# Patient Record
Sex: MALE | Race: WHITE
[De-identification: names, ages, dates, MRNs, and addresses within clinical notes are randomized per-mention and may not be internally consistent; named-entity substitution may affect disease eponyms.]

---

## 2020-04-08 ENCOUNTER — HOSPITAL ENCOUNTER (EMERGENCY)
Dept: HOSPITAL 77 - KA.ED | Age: 85
Discharge: ANOTHER HOSPITAL | End: 2020-04-08
Payer: MEDICARE

## 2020-04-08 DIAGNOSIS — Z87.891: ICD-10-CM

## 2020-04-08 DIAGNOSIS — J18.9: Primary | ICD-10-CM

## 2020-04-08 DIAGNOSIS — Z79.899: ICD-10-CM

## 2020-04-08 LAB
ANION GAP SERPL CALC-SCNC: 17 MMOL/L (ref 5–15)
CHLORIDE SERPL-SCNC: 97 MMOL/L (ref 98–115)
SODIUM SERPL-SCNC: 135 MMOL/L (ref 136–145)

## 2020-04-08 NOTE — CR
______________________________________________________________________________   

  

[id number] RAD/RAD Chest PA And Lateral  

EXAM:   

   

 RAD Chest PA And Lateral  

   

 CLINICAL DATA:   

   

 COUGH  

   

 SHORTNESS OF BREATH  

   

 COMPARISON:   

   

 CORRELATION IS MADE WITH APRIL 6, 2020  

   

 FINDINGS:   

   

 Persistent pleural changes of the right lung base are seen  

   

 Elevation of the right hemidiaphragm is identified  

   

 The left lung is clear  

   

 The cardiomediastinal contour is stable  

   

 IMPRESSION:  

   

 PERSISTENT RIGHT-SIDED PLEURAL PATHOLOGY  

   

 CONSIDER FOLLOW-UP CONTRAST CT CHEST  

   

 Electronically signed by Timothy Oh MD on 4/8/2020 3:08 PM  

   

  

Timothy Oh MD                 

 04/08/20 [id number]    

  

Thank you for allowing us to participate in the care of your patient.

## 2020-04-08 NOTE — CT
______________________________________________________________________________   

  

[id number] CT/CT Chest WO IV  

EXAM: CHEST CT WITHOUT CONTRAST  

   

 INDICATION: PNEUMONIA.  

   

 COMPARISON: Chest x-ray April 8, 2020.  

   

 DISCUSSION: Mild elevation of the right hemidiaphragm which could relate to  

 diaphragmatic dysfunction.  

   

 There are hyperdense infiltrates in the posterior basal right lower lobe which  

 could be from previous barium aspiration. Mild bilateral peripheral reticulation  

 is nonspecific. No definite acute consolidation.  

   

 There is a small right pleural effusion with a thick rind suggesting this could  

 be chronic or complicated by blood products/infection.  

   

 Borderline heart size.  

   

 Small ascites in the imaged upper abdomen. Exophytic 17 mm cyst upper pole left  

 kidney. Arterial calcifications.  

   

 A hyperdense mass anterior to the great vessels and trachea in the superior  

 mediastinum measures about 50 x 41 x 27 mm. The native thyroid gland is small in  

 size. This is nonspecific, but could represent retrosternal thyroid tissue or  

 enlarged lymph node.  

   

 The osseous structures are unremarkable.  

   

 IMPRESSION:    

 1.  Hyperdense posterior basal right lower lobe opacities most suggestive of  

 previous barium aspiration. No definite acute infiltrates.  

 2.  Small right pleural effusion with a surrounding thick rind suggesting this  

 is chronic or has superimposed blood products or infection.  

 3.  Small ascites in the upper abdomen.  

 4.  Indeterminate 50 x 41 x 27 mm hyperdense upper mediastinal mass.  

   

 Electronically signed by Alessandro Amin MD on 4/8/2020 5:50 PM  

   

  

Alessandro Amin MD                 

 04/08/20 [id number]    

  

Thank you for allowing us to participate in the care of your patient.

## 2020-04-08 NOTE — EDM.PDOC
ED HPI GENERAL MEDICAL PROBLEM





- General


Chief Complaint: General


Stated Complaint: cogestive


Time Seen by Provider: 04/08/20 14:46


Source of Information: Reports: Patient


History Limitations: Reports: No Limitations





- History of Present Illness


INITIAL COMMENTS - FREE TEXT/NARRATIVE: 





Patient is an 84-year-old gentleman who presents to the emergency department 

this afternoon via private vehicle with a complaint of shortness of breath.  

Patient states that he was seen at [facility] on Monday, underwent chest x-

ray and was diagnosed with early pneumonia.  Patient states that he was put on 

doxycycline.  Patient states symptoms have continued to worsen and now he feels 

short of breath.  Patient also does have a history of bronchitis, and renal 

failure on peritoneal dialysis.  Patient denies fever, chest pain, travel out 

of local area, family members with similar symptoms, nausea, vomiting, diarrhea.


Onset: Gradual


Duration: Day(s):


Severity: Mild


Improves with: Reports: None


Worsens with: Reports: None


Associated Symptoms: Reports: Cough, Shortness of Breath


Treatments PTA: Reports: Breathing Treatments





- Related Data


 Allergies











Allergy/AdvReac Type Severity Reaction Status Date / Time


 


No Known Drug Allergies Allergy  Cannot Verified 04/08/20 14:23





   Remember  











Home Meds: 


 Home Meds





Acetaminophen [Tylenol Extra Strength] 1,000 mg PO Q6H PRN 04/08/20 [History]


Albuterol [Proventil Neb Soln] 1 ampule INH QID PRN 04/08/20 [History]


Calcitriol [Rocaltrol] 0.25 mcg PO DAILY 04/08/20 [History]


Calcium Acetate [PhosLo] 667 mg PO TIDMEALS 04/08/20 [History]


Doxycycline [Doxycycline Hyclate] 100 mg PO BID 04/08/20 [History]


Ferrous Sulfate [Feosol] 325 mg PO DAILY 04/08/20 [History]


Finasteride [Proscar] 5 mg PO DAILY 04/08/20 [History]


Furosemide [Lasix] 80 mg PO DAILY 04/08/20 [History]


Gentamicin [Gentamicin 0.1%] 1 gm TOP BEDTIME 04/08/20 [History]


Insulin Glargine,Hum.Rec.Anlog [Basaglar Kwikpen U-100] 20 unit SQ DAILY 04/08/ 20 [History]


Insulin Glargine,Hum.Rec.Anlog [Basaglar Kwikpen U-100] 30 unit SQ BEDTIME 04/08 /20 [History]


L.acidoph,Paracasei, B.lactis [Probiotic] 1 each PO DAILY 04/08/20 [History]


Lisinopril [Zestril] 20 mg PO DAILY 04/08/20 [History]


Loratadine [Claritin] 10 mg PO BEDTIME 04/08/20 [History]


Metoprolol Succinate [Toprol Xl] 100 mg PO DAILY 04/08/20 [History]


amLODIPine [Norvasc] 5 mg PO BID 04/08/20 [History]


glipiZIDE [Glucotrol] 10 mg PO BID 04/08/20 [History]


hydrALAZINE [Apresoline] 50 mg PO DAILY 04/08/20 [History]











Social & Family History





- Tobacco Use


Smoking Status *Q: Former Smoker


Used Tobacco, but Quit: Yes


Month/Year Tobacco Last Used: quit 1978





- Caffeine Use


Caffeine Use: Reports: None





- Recreational Drug Use


Recreational Drug Use: No





ED ROS GENERAL





- Review of Systems


Review Of Systems: Comprehensive ROS is negative, except as noted in HPI.


Constitutional: Reports: No Symptoms


HEENT: Reports: No Symptoms


Respiratory: Reports: Shortness of Breath, Wheezing, Cough


Cardiovascular: Reports: No Symptoms


Endocrine: Reports: No Symptoms


GI/Abdominal: Reports: No Symptoms


: Reports: No Symptoms


Musculoskeletal: Reports: No Symptoms


Skin: Reports: No Symptoms


Neurological: Reports: No Symptoms


Psychiatric: Reports: No Symptoms


Hematologic/Lymphatic: Reports: No Symptoms


Immunologic: Reports: No Symptoms





ED EXAM, GENERAL





- Physical Exam


Exam: See Below


Exam Limited By: No Limitations


General Appearance: Alert, WD/WN, No Apparent Distress


Eye Exam: Bilateral Eye: Normal Inspection


Nose: Normal Inspection, Normal Mucosa, No Blood


Throat/Mouth: Normal Inspection, Normal Oropharynx, No Airway Compromise


Head: Atraumatic, Normocephalic


Neck: Normal Inspection.  No: Lymphadenopathy (L), Lymphadenopathy (R)


Respiratory/Chest: No Respiratory Distress, Rales, Wheezing


Cardiovascular: Regular Rate, Rhythm, No Murmur


GI/Abdominal: Normal Bowel Sounds, Soft, Non-Tender


Back Exam: Normal Inspection.  No: CVA Tenderness (L), CVA Tenderness (R)


Extremities: Normal Inspection, No Pedal Edema


Neurological: Alert, Oriented, Normal Cognition


Psychiatric: Normal Affect, Normal Mood


Skin Exam: Warm, Dry, Intact, Normal Color, No Rash





Course





- Vital Signs


Last Recorded V/S: 





 Last Vital Signs











Temp  96.1 F L  04/08/20 14:18


 


Pulse  81   04/08/20 14:18


 


Resp  18   04/08/20 14:18


 


BP  167/66 H  04/08/20 14:18


 


Pulse Ox  93 L  04/08/20 14:18














- Orders/Labs/Meds


Orders: 





 Active Orders 24 hr











 Category Date Time Status


 


 Chest 2V [CR] Stat Exams  04/08/20 14:29 Ordered


 


 CBC WITH AUTO DIFF [HEME] Stat Lab  04/08/20 14:33 Received


 


 COMPREHENSIVE METABOLIC PN,CMP [CHEM] Stat Lab  04/08/20 14:33 Received


 


 INFLUENZA A+B AG SCREEN [RM] Stat Lab  04/08/20 14:35 Received


 


 Isolation [COMM] Routine Oth  04/08/20 14:30 Ordered














- Radiology Interpretation


Free Text/Narrative:: 





Chest x-ray shows right lower lobe pneumonia





- Re-Assessments/Exams


Free Text/Narrative Re-Assessment/Exam: 





04/08/20 15:24


Patient afebrile, vital signs stable, influenza negative.  1 g Rocephin and 500 

mg Zithromax given in ER.  Due to patient's renal failure state, normal saline 

infusion pending to correct glucose pending upon admission.


04/08/20 15:27





04/08/20 15:34


Discussed case with Ana Charlton, nurse practitioner from [facility].  

Patient will be admitted inpatient and followed.





Departure





- Departure


Time of Disposition: 15:32


Disposition: Admitted As Inpatient 68


Condition: Fair


Clinical Impression: 


 Pneumonia








- Discharge Information


Referrals: 


PCP,Not In Area [Family Provider] - 





Sepsis Event Note





- Evaluation


Sepsis Screening Result: No Definite Risk





- Focused Exam


Vital Signs: 





 Vital Signs











  Temp Pulse Resp BP Pulse Ox


 


 04/08/20 14:18  96.1 F L  81  18  167/66 H  93 L











Date Exam was Performed: 04/08/20


Time Exam was Performed: 14:47





- My Orders


Last 24 Hours: 





My Active Orders





04/08/20 14:29


Chest 2V [CR] Stat 





04/08/20 14:30


Isolation [COMM] Routine 





04/08/20 14:33


CBC WITH AUTO DIFF [HEME] Stat 


COMPREHENSIVE METABOLIC PN,CMP [CHEM] Stat 





04/08/20 14:35


INFLUENZA A+B AG SCREEN [RM] Stat 














- Assessment/Plan


Last 24 Hours: 





My Active Orders





04/08/20 14:29


Chest 2V [CR] Stat 





04/08/20 14:30


Isolation [COMM] Routine 





04/08/20 14:33


CBC WITH AUTO DIFF [HEME] Stat 


COMPREHENSIVE METABOLIC PN,CMP [CHEM] Stat 





04/08/20 14:35


INFLUENZA A+B AG SCREEN [RM] Stat 











Assessment:: 





Pneumonia


Plan: 





Admit to [place]

## 2020-11-22 ENCOUNTER — HOSPITAL ENCOUNTER (EMERGENCY)
Dept: HOSPITAL 52 - LL.ED | Age: 85
LOS: 1 days | Discharge: NURSING, REHAB OR LONG-TERM CARE | End: 2020-11-23
Payer: MEDICARE

## 2020-11-22 DIAGNOSIS — E11.40: ICD-10-CM

## 2020-11-22 DIAGNOSIS — E66.9: ICD-10-CM

## 2020-11-22 DIAGNOSIS — J44.9: ICD-10-CM

## 2020-11-22 DIAGNOSIS — Z79.899: ICD-10-CM

## 2020-11-22 DIAGNOSIS — I25.10: ICD-10-CM

## 2020-11-22 DIAGNOSIS — Z79.4: ICD-10-CM

## 2020-11-22 DIAGNOSIS — I10: ICD-10-CM

## 2020-11-22 DIAGNOSIS — N40.0: ICD-10-CM

## 2020-11-22 DIAGNOSIS — E11.51: ICD-10-CM

## 2020-11-22 DIAGNOSIS — E87.1: ICD-10-CM

## 2020-11-22 DIAGNOSIS — U07.1: Primary | ICD-10-CM

## 2020-11-22 LAB
CHLORIDE SERPL-SCNC: 92 MMOL/L (ref 98–107)
SODIUM SERPL-SCNC: 128 MMOL/L (ref 136–145)

## 2020-11-22 RX ADMIN — Medication PRN ML: at 22:48

## 2020-11-22 RX ADMIN — Medication PRN ML: at 22:13

## 2020-11-22 NOTE — EDM.PDOC
ED HPI GENERAL MEDICAL PROBLEM





- General


Chief Complaint: Cardiovascular Problem


Stated Complaint: SOB


Time Seen by Provider: 11/22/20 21:43


Source of Information: Reports: Patient


History Limitations: Reports: Respiratory Distress





- History of Present Illness


INITIAL COMMENTS - FREE TEXT/NARRATIVE: 





Patient comes to ER via EMS due to worsening SOB secondary to Covid.  Was recen

tly inpatient at [facility] for treatment of Covid-19. Sent home with home

oxygen.  Suddenly increasing SOB this evening while patient performing home 

dialysis. Patient does not recall other acute changes since discharge, just 

ongoing SOB and fatigue.  Denies any specific fever/GI complaints/new pain.





Initially seen in this facility Nov 12th with + Covid test at that time.  





- Related Data


                                    Allergies











Allergy/AdvReac Type Severity Reaction Status Date / Time


 


No Known Drug Allergies Allergy  Cannot Verified 11/22/20 22:18





   Remember  











Home Meds: 


                                    Home Meds





Acetaminophen [Tylenol Extra Strength] 1,000 mg PO Q6H PRN 04/08/20 [History]


Albuterol [Proventil Neb Soln] 1 ampule INH QID PRN 04/08/20 [History]


Calcium Acetate [PhosLo] 667 mg PO TIDMEALS 04/08/20 [History]


Doxycycline [Doxycycline Hyclate] 100 mg PO BID 04/08/20 [History]


Ferrous Sulfate [Feosol] 325 mg PO DAILY 04/08/20 [History]


Finasteride [Proscar] 5 mg PO DAILY 04/08/20 [History]


Furosemide [Lasix] 80 mg PO DAILY 04/08/20 [History]


Gentamicin [Gentamicin 0.1%] 1 gm TOP BEDTIME 04/08/20 [History]


Insulin Glargine,Hum.Rec.Anlog [Basaglar Kwikpen U-100] 20 unit SQ DAILY 

04/08/20 [History]


Insulin Glargine,Hum.Rec.Anlog [Basaglar Kwikpen U-100] 30 unit SQ BEDTIME 

04/08/20 [History]


L.acidoph,Paracasei, B.lactis [Probiotic] 1 each PO DAILY 04/08/20 [History]


Loratadine [Claritin] 10 mg PO BEDTIME 04/08/20 [History]


Metoprolol Succinate [Toprol Xl] 100 mg PO DAILY 04/08/20 [History]


amLODIPine [Norvasc] 5 mg PO BID 04/08/20 [History]


calcitrioL [Rocaltrol] 0.25 mcg PO DAILY 04/08/20 [History]


glipiZIDE [Glucotrol] 10 mg PO BID 04/08/20 [History]


hydrALAZINE [Apresoline] 50 mg PO DAILY 04/08/20 [History]


lisinopriL [Zestril] 20 mg PO DAILY 04/08/20 [History]











Past Medical History


HEENT History: Reports: Allergic Rhinitis, Cataract, Hard of Hearing, Impaired 

Vision


Cardiovascular History: Reports: CAD, High Cholesterol, Hypertension, PVD


Respiratory History: Reports: Bronchitis, Recurrent, COPD, Pneumonia, Recurrent,

 Other (See Below) (chronic right pleural effusion)


Gastrointestinal History: Reports: Chronic Constipation, Colon Polyp, Gastritis,

 Hepatitis, Irritable Bowel Syndrome, Jaundice, PUD, Other (See Below) (hx colon

 resection)


Genitourinary History: Reports: BPH, Dialysis


Musculoskeletal History: Reports: Amputation (right first toe), Arthritis, Back 

Pain, Chronic, Neck Pain, Chronic, Osteoarthritis


Neurological History: Reports: Neuropathy, Diabetic


Psychiatric History: Reports: None


Endocrine/Metabolic History: Reports: IDDM, Obesity/BMI 30+


Hematologic History: Reports: Anemia, Idiopathic Thrombocytopenia


Oncologic (Cancer) History: Reports: Colon





Social & Family History





- Caffeine Use


Caffeine Use: Reports: None





ED ROS GENERAL





- Review of Systems


Review Of Systems: See Below


Constitutional: Reports: Weakness, Fatigue.  Denies: Fever, Chills, Night Swea

ts, Diaphoresis


HEENT: Reports: Other (no acute changes)


Respiratory: Reports: Shortness of Breath, Cough.  Denies: Pleuritic Chest Pain,

 Hemoptysis


Cardiovascular: Reports: Dyspnea on Exertion.  Denies: Chest Pain, 

Lightheadedness, Palpitations, Syncope


GI/Abdominal: Reports: No Symptoms


: Reports: No Symptoms


Musculoskeletal: Reports: Other (no acute changes from baseline)


Skin: Reports: Bruising (recent IVs/SQ shots/blood work)


Neurological: Reports: Weakness (global).  Denies: Confusion, Headache, Change 

in Speech


Psychiatric: Reports: No Symptoms





ED EXAM, GENERAL





- Physical Exam


Exam: See Below


Exam Limited By: Respiratory Distress


General Appearance: Alert, Mild Distress, Other (On NRB mask at 15L.  

Alert/answering questions)


Eye Exam: Bilateral Eye: EOMI, PERRL


Ears: Hearing Grossly Normal


Nose: No: Nasal Deformity, Nasal Swelling, Nasal Drainage


Throat/Mouth: Normal Voice


Head: Atraumatic, Normocephalic


Neck: Supple


Respiratory/Chest: Accessory Muscle Use (mild), Other (decreased breath sounds 

throughout.  No obvious wheezes/rales/rhonchi but significant background noise 

limited ascultation.  ).  No: Retractions


Cardiovascular: Regular Rate, Rhythm


GI/Abdominal: Soft, Non-Tender, Other (Home dialysis catheter present)


 (Male) Exam: Deferred


Rectal (Males) Exam: Deferred


Back Exam: No: CVA Tenderness (L), CVA Tenderness (R)


Extremities: Normal Capillary Refill


Neurological: Alert, Oriented, Normal Cognition


Psychiatric: Normal Affect, Normal Mood


Skin Exam: Warm, Dry, Normal Color


  ** #1 Interpretation


EKG Date: 11/22/20


Time: 21:45


Rate (Beats/Min): 90


EKG Interpretation Comments: 





Previous EKG from Nov 12th shows sinus rhythm.  Today's EKG shows new bundle 

branch block/difficult to identify p-waves/may reflect Afib





Course





- Vital Signs


Last Recorded V/S: 


                                Last Vital Signs











Temp  36.3 C   11/22/20 22:33


 


Pulse  89   11/22/20 22:33


 


Resp  28 H  11/22/20 22:33


 


BP  124/59 L  11/22/20 22:33


 


Pulse Ox  88 L  11/22/20 22:33














- Orders/Labs/Meds


Orders: 


                               Active Orders 24 hr











 Category Date Time Status


 


 EKG Documentation Completion [RC] ASDIRECTED Care  11/22/20 21:36 Active


 


 Chest 1V Frontal [CR] Stat Exams  11/22/20 21:35 Ordered


 


 UA W/MICROSCOPIC [URIN] Stat Lab  11/22/20 21:37 Ordered


 


 Azithromycin [Zithromax] 500 mg Med  11/22/20 22:35 Ordered





 Sodium Chloride 0.9% [Normal Saline] 250 ml   





 IV ONETIME   


 


 Sodium Chloride 0.9% [Saline Flush] Med  11/22/20 22:12 Active





 10 ml FLUSH ASDIRECTED PRN   


 


 Saline Lock Insert [OM.PC] Routine Oth  11/22/20 22:12 Ordered








                                Medication Orders





Azithromycin 500 mg/ Sodium (Chloride)  250 mls @ 250 mls/hr IV ONETIME ONE


   Stop: 11/22/20 23:34


   Last Admin: 11/22/20 22:46  Dose: 250 mls/hr


   Documented by: JUAN


Sodium Chloride (Saline Flush)  10 ml FLUSH ASDIRECTED PRN


   PRN Reason: Keep Vein Open


   Last Admin: 11/22/20 22:48  Dose: 10 ml


   Documented by: JUAN


   Admin: 11/22/20 22:13  Dose: 10 ml


   Documented by: JUAN








Labs: 


                                Laboratory Tests











  11/22/20 11/22/20 11/22/20 Range/Units





  22:06 22:06 22:06 


 


WBC  19.5 H    (4.0-10.2)  K/uL


 


RBC  3.18 L    (4.33-5.41)  M/uL


 


Hgb  9.1 L    (13.1-16.8)  g/dL


 


Hct  26.1 L    (39.0-49.0)  %


 


MCV  82.1 L    (84.0-98.0)  fL


 


MCH  28.6    (28.2-33.3)  pg


 


MCHC  34.9    (31.7-36.0)  g/dL


 


RDW  15.2 H    (11.2-14.1)  %


 


Plt Count  189    (150-350)  K/uL


 


Neut % (Auto)  93.9 H    (45.0-80.0)  %


 


Lymph % (Auto)  2.1 L    (10.0-50.0)  %


 


Mono % (Auto)  3.8    (2.0-14.0)  %


 


Eos % (Auto)  0.0    (0.0-5.0)  %


 


Baso % (Auto)  0.2    (0.0-2.0)  %


 


Neut # (Auto)  18.27 H    (1.40-7.00)  K/uL


 


Lymph # (Auto)  0.41 L    (0.50-3.50)  K/uL


 


Mono # (Auto)  0.73    (0.00-1.00)  K/uL


 


Eos # (Auto)  0.00    (0.00-0.50)  K/uL


 


Baso # (Auto)  0.04    (0.00-0.20)  K/uL


 


D-Dimer, Quantitative    4130 H  (0-400)  ng/mL


 


Sodium   128 L   (136-145)  mmol/L


 


Potassium   5.1   (3.5-5.1)  mmol/L


 


Chloride   92 L   ()  mmol/L


 


Carbon Dioxide   22.3   (21.0-32.0)  mmol/L


 


BUN   122 H*   (7-18)  mg/dL


 


Creatinine   4.88 H*   (0.51-1.17)  mg/dL


 


Est Cr Clr Drug Dosing   12.96   mL/min


 


Estimated GFR (MDRD)   11   mL/min


 


Glucose   466 H*   ()  mg/dL


 


Calcium   6.9 L   (8.5-10.1)  mg/dL


 


Magnesium   2.3   (1.8-2.4)  mg/dL


 


Total Bilirubin   0.6   (0.2-1.0)  mg/dL


 


AST   26   (15-37)  U/L


 


ALT   45   (12-78)  U/L


 


Alkaline Phosphatase   95   ()  IU/L


 


Troponin I   0.005   (0.000-0.056)  ng/mL


 


NT-Pro-B Natriuret Pep   2802 H   (0-125)  pg/mL


 


Total Protein   6.2 L   (6.4-8.2)  g/dL


 


Albumin   1.9 L   (3.4-5.0)  g/dL











Meds: 


Medications











Generic Name Dose Route Start Last Admin





  Trade Name Freq  PRN Reason Stop Dose Admin


 


Azithromycin 500 mg/ Sodium  250 mls @ 250 mls/hr  11/22/20 22:35  11/22/20 

22:46





  Chloride  IV  11/22/20 23:34  250 mls/hr





  ONETIME ONE   Administration


 


Sodium Chloride  10 ml  11/22/20 22:12  11/22/20 22:48





  Saline Flush  FLUSH   10 ml





  ASDIRECTED PRN   Administration





  Keep Vein Open  














Discontinued Medications














Generic Name Dose Route Start Last Admin





  Trade Name Freq  PRN Reason Stop Dose Admin


 


Ceftriaxone Sodium  1 gm  11/22/20 22:35  11/22/20 23:00





  Rocephin  IVPUSH  11/22/20 22:36  1 gm





  ONETIME ONE   Administration


 


Dexamethasone  6 mg  11/22/20 22:32  11/22/20 22:46





  Decadron  IVPUSH  11/22/20 22:33  6 mg





  ONETIME ONE   Administration


 


Heparin Sodium (Porcine)  5,000 units  11/22/20 22:33  11/22/20 22:46





  Heparin Sodium  SUBCUT  11/22/20 22:34  5,000 units





  ONETIME ONE   Administration














- Radiology Interpretation


Free Text/Narrative:: 





Worsening lower infiltrates noted bilateral lungs when compared to previous xray

 from Nov 12th. 





- Re-Assessments/Exams


Free Text/Narrative Re-Assessment/Exam: 





11/22/20 22:35





WBC 19.5/left shift  Hgb 9.1


Diffuse patchy infiltrates bilaterally on chest xray consistent with Covid-19 

pattern. 


O2 sats improved to 88%





Discussed patient with  at [facility].  Patient accepted for 

transfer.  Advised to give Decadron, Heparin, Rocephin, and Zithromax prior to 

transfer. Pending bed assignment at this time. 





23:13


Patient remains stable. 


Na 128


Elevated BUN/Cr


Glucose 466


ProBNP 2802


Ca and albumin low


DDimer 4130


Troponin within normal limits. 





Insulin ordered/single dose. 








Departure





- Departure


Time of Disposition: 23:23


Disposition: DC/Tfer to [facility]


Reason for Transfer *Q: Other


Condition: Serious


Clinical Impression: 


 COVID-19, Respiratory difficulty, Hyponatremia





Forms:  ED Department Discharge





Sepsis Event Note (ED)





- Focused Exam


Vital Signs: 


                                   Vital Signs











  Temp Pulse Resp BP Pulse Ox


 


 11/22/20 22:33  36.3 C  89  28 H  124/59 L  88 L


 


 11/22/20 22:05  36.2 C  90  28 H  131/68  86 L














- My Orders


Last 24 Hours: 


My Active Orders





11/22/20 21:35


Chest 1V Frontal [CR] Stat 





11/22/20 21:36


EKG Documentation Completion [RC] ASDIRECTED 





11/22/20 21:37


UA W/MICROSCOPIC [URIN] Stat 





11/22/20 22:12


Sodium Chloride 0.9% [Saline Flush]   10 ml FLUSH ASDIRECTED PRN 


Saline Lock Insert [OM.PC] Routine 





11/22/20 22:35


Azithromycin [Zithromax] 500 mg   Sodium Chloride 0.9% [Normal Saline] 250 ml IV

ONETIME 














- Assessment/Plan


Last 24 Hours: 


My Active Orders





11/22/20 21:35


Chest 1V Frontal [CR] Stat 





11/22/20 21:36


EKG Documentation Completion [RC] ASDIRECTED 





11/22/20 21:37


UA W/MICROSCOPIC [URIN] Stat 





11/22/20 22:12


Sodium Chloride 0.9% [Saline Flush]   10 ml FLUSH ASDIRECTED PRN 


Saline Lock Insert [OM.PC] Routine 





11/22/20 22:35


Azithromycin [Zithromax] 500 mg   Sodium Chloride 0.9% [Normal Saline] 250 ml IV

ONETIME